# Patient Record
Sex: MALE | Race: WHITE | URBAN - METROPOLITAN AREA
[De-identification: names, ages, dates, MRNs, and addresses within clinical notes are randomized per-mention and may not be internally consistent; named-entity substitution may affect disease eponyms.]

---

## 2022-05-23 ENCOUNTER — OFFICE VISIT (OUTPATIENT)
Dept: URGENT CARE | Age: 27
End: 2022-05-23
Payer: COMMERCIAL

## 2022-05-23 VITALS
SYSTOLIC BLOOD PRESSURE: 129 MMHG | OXYGEN SATURATION: 97 % | HEIGHT: 71 IN | DIASTOLIC BLOOD PRESSURE: 79 MMHG | BODY MASS INDEX: 23.1 KG/M2 | WEIGHT: 165 LBS | HEART RATE: 58 BPM | TEMPERATURE: 98.6 F

## 2022-05-23 DIAGNOSIS — J02.9 PHARYNGITIS, UNSPECIFIED ETIOLOGY: ICD-10-CM

## 2022-05-23 DIAGNOSIS — R50.9 FEVER, UNSPECIFIED FEVER CAUSE: Primary | ICD-10-CM

## 2022-05-23 DIAGNOSIS — B34.9 VIRAL ILLNESS: ICD-10-CM

## 2022-05-23 LAB
INFLUENZA A ANTIGEN, POC: NEGATIVE
INFLUENZA B ANTIGEN, POC: NEGATIVE
Lab: NORMAL
PERFORMING INSTRUMENT: NORMAL
QC PASS/FAIL: NORMAL
S PYO AG THROAT QL: NORMAL
SARS-COV-2, POC: NORMAL

## 2022-05-23 PROCEDURE — G8420 CALC BMI NORM PARAMETERS: HCPCS

## 2022-05-23 PROCEDURE — 87426 SARSCOV CORONAVIRUS AG IA: CPT

## 2022-05-23 PROCEDURE — 87880 STREP A ASSAY W/OPTIC: CPT

## 2022-05-23 PROCEDURE — 99203 OFFICE O/P NEW LOW 30 MIN: CPT

## 2022-05-23 PROCEDURE — G8427 DOCREV CUR MEDS BY ELIG CLIN: HCPCS

## 2022-05-23 PROCEDURE — 4004F PT TOBACCO SCREEN RCVD TLK: CPT

## 2022-05-23 PROCEDURE — 87804 INFLUENZA ASSAY W/OPTIC: CPT

## 2022-05-23 ASSESSMENT — ENCOUNTER SYMPTOMS
GASTROINTESTINAL NEGATIVE: 1
SORE THROAT: 1
SINUS PAIN: 0
RESPIRATORY NEGATIVE: 1
SINUS PRESSURE: 0

## 2022-05-23 NOTE — PATIENT INSTRUCTIONS
Covid swab in office today---negative  Flu swab in office today--negative  Strep swab in office today--negative  Obtain rest.  Maintain hydration. Wash hands often with soap and water. Avoid smoke and other irritants. Take OTC pain relievers as needed. Use saline nasal spray as needed. Use humidifier in room at night. Discussed precautions and indications for returning to clinic. Discussed precautions and indications for seeking ED care. Patient Education        Sore Throat: Care Instructions  Overview     Infection by bacteria or a virus causes most sore throats. Cigarette smoke, dry air, air pollution, allergies, and yelling can also cause a sore throat. Sore throats can be painful and annoying. Fortunately, most sore throats go away on their own. If you have a bacterial infection, your doctor may prescribeantibiotics. Follow-up care is a key part of your treatment and safety. Be sure to make and go to all appointments, and call your doctor if you are having problems. It's also a good idea to know your test results and keep alist of the medicines you take. How can you care for yourself at home?  If your doctor prescribed antibiotics, take them as directed. Do not stop taking them just because you feel better. You need to take the full course of antibiotics.  Gargle with warm salt water several times a day to help reduce swelling and relieve pain. Mix 1/2 teaspoon of salt in 1 cup of warm water.  Take an over-the-counter pain medicine, such as acetaminophen (Tylenol), ibuprofen (Advil, Motrin), or naproxen (Aleve). Read and follow all instructions on the label.  Be careful when taking over-the-counter cold or flu medicines and Tylenol at the same time. Many of these medicines have acetaminophen, which is Tylenol. Read the labels to make sure that you are not taking more than the recommended dose. Too much acetaminophen (Tylenol) can be harmful.  Drink plenty of fluids.  Fluids may help soothe an irritated throat. Hot fluids, such as tea or soup, may help decrease throat pain.  Use over-the-counter throat lozenges to soothe pain. Regular cough drops or hard candy may also help. These should not be given to young children because of the risk of choking.  Do not smoke or allow others to smoke around you. If you need help quitting, talk to your doctor about stop-smoking programs and medicines. These can increase your chances of quitting for good.  Use a vaporizer or humidifier to add moisture to your bedroom. Follow the directions for cleaning the machine. When should you call for help? Call your doctor now or seek immediate medical care if:     You have trouble breathing.      Your sore throat gets much worse on one side.      You have new or worse trouble swallowing.      You have a new or higher fever. Watch closely for changes in your health, and be sure to contact your doctor ifyou do not get better as expected. Where can you learn more? Go to https://[employer]. org and sign in to your [employer] account. Enter R922 in the One[employer] box to learn more about \"Sore Throat: Care Instructions. \"     If you do not have an account, please click on the \"Sign Up Now\" link. Current as of: September 8, 2021               Content Version: 13.2  © 1962-7167 [employer]. Care instructions adapted under license by [facility]. If you have questions about a medical condition or this instruction, always ask your healthcare professional. [place] [address] any warranty or liability for your use of this information. Patient Education        Viral Respiratory Infection: Care Instructions  Your Care Instructions     Viruses are very small organisms. They grow in number after they enter your body. There are many types that cause different illnesses, such as colds andthe mumps.   The symptoms of a viral respiratory infection often start quickly. They include a fever, sore throat, and runny nose. You may also just not feel well. Or youmay not want to eat much. Most viral respiratory infections are not serious. They usually get better withtime and self-care. Antibiotics are not used to treat a viral infection. That's because antibiotics will not help cure a viral illness. In some cases, antiviral medicine can helpyour body fight a serious viral infection. Follow-up care is a key part of your treatment and safety. Be sure to make and go to all appointments, and call your doctor if you are having problems. It's also a good idea to know your test results and keep alist of the medicines you take. How can you care for yourself at home?  Rest as much as possible until you feel better.  Be safe with medicines. Take your medicine exactly as prescribed. Call your doctor if you think you are having a problem with your medicine. You will get more details on the specific medicine your doctor prescribes.  Take an over-the-counter pain medicine, such as acetaminophen (Tylenol), ibuprofen (Advil, Motrin), or naproxen (Aleve), as needed for pain and fever. Read and follow all instructions on the label. Do not give aspirin to anyone younger than 20. It has been linked to Reye syndrome, a serious illness.  Drink plenty of fluids. Hot fluids, such as tea or soup, may help relieve congestion in your nose and throat. If you have kidney, heart, or liver disease and have to limit fluids, talk with your doctor before you increase the amount of fluids you drink.  Try to clear mucus from your lungs by breathing deeply and coughing.  Gargle with warm salt water once an hour. This can help reduce swelling and throat pain. Use 1 teaspoon of salt mixed in 1 cup of warm water.  Do not smoke or allow others to smoke around you. If you need help quitting, talk to your doctor about stop-smoking programs and medicines.  These can increase your chances of quitting for good.  To avoid spreading the virus   Cough or sneeze into a tissue. Then throw the tissue away.  If you don't have a tissue, use your hand to cover your cough or sneeze. Then clean your hand. You can also cough into your sleeve.  Wash your hands often. Use soap and warm water. Wash for 15 to 20 seconds each time.  If you don't have soap and water near you, you can clean your hands with alcohol wipes or gel. When should you call for help? Call your doctor now or seek immediate medical care if:     You have a new or higher fever.      Your fever lasts more than 48 hours.      You have trouble breathing.      You have a fever with a stiff neck or a severe headache.      You are sensitive to light.      You feel very sleepy or confused. Watch closely for changes in your health, and be sure to contact your doctor if:     You do not get better as expected. Where can you learn more? Go to https://[employer]. org and sign in to your [employer] account. Enter N663 in the [employer] box to learn more about \"Viral Respiratory Infection: Care Instructions. \"     If you do not have an account, please click on the \"Sign Up Now\" link. Current as of: July 6, 2021               Content Version: 13.2  © 2006-2022 [employer]. Care instructions adapted under license by [facility] ([facility]). If you have questions about a medical condition or this instruction, always ask your healthcare professional. [place] [address] any warranty or liability for your use of this information.

## 2022-05-23 NOTE — PROGRESS NOTES
Dani Clayton (: 1995) is a 27 y.o. male here for evaluation of the following chief complaint(s):  Pharyngitis      SUBJECTIVE:  HPI  Pt presents today with c/o sore throat, fatigue, chills and myalgias that began yesterday. Pt notes he has tried OTC remedies with minimal relief. He denies any known exposure to Covid, however would like to be tested today. Review of Systems   Constitutional: Positive for activity change, chills and fatigue. HENT: Positive for sore throat. Negative for congestion, ear pain, sinus pressure and sinus pain. Respiratory: Negative. Cardiovascular: Negative. Gastrointestinal: Negative. Musculoskeletal: Positive for myalgias. Neurological: Negative. OBJECTIVE:  /79   Pulse 58   Temp 98.6 °F (37 °C)   Ht 5' 11\" (1.803 m)   Wt 165 lb (74.8 kg)   SpO2 97%   BMI 23.01 kg/m²    Physical Exam  Vitals reviewed. Constitutional:       Appearance: Normal appearance. He is normal weight. HENT:      Head: Normocephalic. Right Ear: Tympanic membrane normal.      Left Ear: Tympanic membrane normal.      Nose: Nose normal.      Mouth/Throat:      Mouth: Mucous membranes are moist.      Pharynx: Oropharynx is clear. Posterior oropharyngeal erythema present. No oropharyngeal exudate. Eyes:      Extraocular Movements: Extraocular movements intact. Conjunctiva/sclera: Conjunctivae normal.      Pupils: Pupils are equal, round, and reactive to light. Cardiovascular:      Rate and Rhythm: Normal rate and regular rhythm. Pulses: Normal pulses. Heart sounds: Normal heart sounds. Pulmonary:      Effort: Pulmonary effort is normal.      Breath sounds: Normal breath sounds. Abdominal:      General: Abdomen is flat. Bowel sounds are normal.      Palpations: Abdomen is soft. Musculoskeletal:         General: Normal range of motion. Cervical back: Normal range of motion. Lymphadenopathy:      Cervical: Cervical adenopathy present. Skin:     General: Skin is warm and dry. Capillary Refill: Capillary refill takes less than 2 seconds. Neurological:      General: No focal deficit present. Mental Status: He is alert and oriented to person, place, and time. Mental status is at baseline. Psychiatric:         Mood and Affect: Mood normal.         Behavior: Behavior normal.         Thought Content: Thought content normal.         Judgment: Judgment normal.         ASSESSMENT:  1. Fever, unspecified fever cause  -     POCT COVID-19, Antigen  -     POCT Influenza A/B Antigen (BD Veritor)  -     POCT rapid strep A  2. Pharyngitis, unspecified etiology  -     POCT COVID-19, Antigen  -     POCT Influenza A/B Antigen (BD Veritor)  -     POCT rapid strep A  3. Viral illness      PLAN:  Covid swab in office today---negative  Flu swab in office today--negative  Strep swab in office today--negative  Obtain rest.  Maintain hydration. Wash hands often with soap and water. Avoid smoke and other irritants. Take OTC pain relievers as needed. Use saline nasal spray as needed. Use humidifier in room at night. Discussed precautions and indications for returning to clinic. Discussed precautions and indications for seeking ED care. Return if symptoms worsen or fail to improve.      Electronically signed by JUWAN Moses CNP on 5/23/2022 at 5:49 PM.